# Patient Record
Sex: FEMALE | Race: WHITE | Employment: FULL TIME
[De-identification: names, ages, dates, MRNs, and addresses within clinical notes are randomized per-mention and may not be internally consistent; named-entity substitution may affect disease eponyms.]

---

## 2017-10-15 ENCOUNTER — HEALTH MAINTENANCE LETTER (OUTPATIENT)
Age: 27
End: 2017-10-15

## 2018-03-14 NOTE — PATIENT INSTRUCTIONS
Pap due sept 2019        Preventive Health Recommendations  Female Ages 26 - 39  Yearly exam:   See your health care provider every year in order to    Review health changes.     Discuss preventive care.      Review your medicines if you your doctor has prescribed any.    Until age 30: Get a Pap test every three years (more often if you have had an abnormal result).    After age 30: Talk to your doctor about whether you should have a Pap test every 3 years or have a Pap test with HPV screening every 5 years.   You do not need a Pap test if your uterus was removed (hysterectomy) and you have not had cancer.  You should be tested each year for STDs (sexually transmitted diseases), if you're at risk.   Talk to your provider about how often to have your cholesterol checked.  If you are at risk for diabetes, you should have a diabetes test (fasting glucose).  Shots: Get a flu shot each year. Get a tetanus shot every 10 years.   Nutrition:     Eat at least 5 servings of fruits and vegetables each day.    Eat whole-grain bread, whole-wheat pasta and brown rice instead of white grains and rice.    Talk to your provider about Calcium and Vitamin D.     Lifestyle    Exercise at least 150 minutes a week (30 minutes a day, 5 days of the week). This will help you control your weight and prevent disease.    Limit alcohol to one drink per day.    No smoking.     Wear sunscreen to prevent skin cancer.    See your dentist every six months for an exam and cleaning.  Prev

## 2018-03-16 ENCOUNTER — OFFICE VISIT (OUTPATIENT)
Dept: FAMILY MEDICINE | Facility: CLINIC | Age: 28
End: 2018-03-16
Payer: COMMERCIAL

## 2018-03-16 VITALS
DIASTOLIC BLOOD PRESSURE: 71 MMHG | HEIGHT: 64 IN | WEIGHT: 157 LBS | BODY MASS INDEX: 26.8 KG/M2 | TEMPERATURE: 98.3 F | OXYGEN SATURATION: 100 % | HEART RATE: 68 BPM | SYSTOLIC BLOOD PRESSURE: 106 MMHG

## 2018-03-16 DIAGNOSIS — Z00.01 ENCOUNTER FOR ROUTINE ADULT HEALTH EXAMINATION WITH ABNORMAL FINDINGS: Primary | ICD-10-CM

## 2018-03-16 DIAGNOSIS — R10.9 FLANK PAIN: ICD-10-CM

## 2018-03-16 LAB
ALBUMIN UR-MCNC: NEGATIVE MG/DL
APPEARANCE UR: CLEAR
BILIRUB UR QL STRIP: NEGATIVE
COLOR UR AUTO: YELLOW
GLUCOSE UR STRIP-MCNC: NEGATIVE MG/DL
HGB UR QL STRIP: NEGATIVE
KETONES UR STRIP-MCNC: NEGATIVE MG/DL
LEUKOCYTE ESTERASE UR QL STRIP: NEGATIVE
NITRATE UR QL: NEGATIVE
PH UR STRIP: 5.5 PH (ref 5–7)
SOURCE: NORMAL
SP GR UR STRIP: >1.03 (ref 1–1.03)
UROBILINOGEN UR STRIP-ACNC: 0.2 EU/DL (ref 0.2–1)

## 2018-03-16 PROCEDURE — 99385 PREV VISIT NEW AGE 18-39: CPT | Performed by: PHYSICIAN ASSISTANT

## 2018-03-16 PROCEDURE — 81003 URINALYSIS AUTO W/O SCOPE: CPT | Performed by: PHYSICIAN ASSISTANT

## 2018-03-16 NOTE — MR AVS SNAPSHOT
After Visit Summary   3/16/2018    Za Urrutia    MRN: [id number]           Patient Information     Date Of Birth          1990        Visit Information        Provider Department      3/16/2018 11:00 AM Elvira Wynne PA-C [facility]        Today's Diagnoses     Encounter for routine adult health examination with abnormal findings    -  1    Flank pain          Care Instructions     Pap due sept 2019        Preventive Health Recommendations  Female Ages 26 - 39  Yearly exam:   See your health care provider every year in order to    Review health changes.     Discuss preventive care.      Review your medicines if you your doctor has prescribed any.    Until age 30: Get a Pap test every three years (more often if you have had an abnormal result).    After age 30: Talk to your doctor about whether you should have a Pap test every 3 years or have a Pap test with HPV screening every 5 years.   You do not need a Pap test if your uterus was removed (hysterectomy) and you have not had cancer.  You should be tested each year for STDs (sexually transmitted diseases), if you're at risk.   Talk to your provider about how often to have your cholesterol checked.  If you are at risk for diabetes, you should have a diabetes test (fasting glucose).  Shots: Get a flu shot each year. Get a tetanus shot every 10 years.   Nutrition:     Eat at least 5 servings of fruits and vegetables each day.    Eat whole-grain bread, whole-wheat pasta and brown rice instead of white grains and rice.    Talk to your provider about Calcium and Vitamin D.     Lifestyle    Exercise at least 150 minutes a week (30 minutes a day, 5 days of the week). This will help you control your weight and prevent disease.    Limit alcohol to one drink per day.    No smoking.     Wear sunscreen to prevent skin cancer.    See your dentist every six months for an exam and cleaning.  Prev            Follow-ups after your  "visit        Who to contact     If you have questions or need follow up information about today's clinic visit or your schedule please contact [facility] directly at [phone].  Normal or non-critical lab and imaging results will be communicated to you by MyChart, letter or phone within 4 business days after the clinic has received the results. If you do not hear from us within 7 days, please contact the clinic through Breakout Studioshart or phone. If you have a critical or abnormal lab result, we will notify you by phone as soon as possible.  Submit refill requests through Simpler or call your pharmacy and they will forward the refill request to us. Please allow 3 business days for your refill to be completed.          Additional Information About Your Visit        Simpler Information     Simpler gives you secure access to your electronic health record. If you see a primary care provider, you can also send messages to your care team and make appointments. If you have questions, please call your primary care clinic.  If you do not have a primary care provider, please call [phone] and they will assist you.        Care EveryWhere ID     This is your Care EveryWhere ID. This could be used by other organizations to access your [facility] medical records  [id number]        Your Vitals Were     Pulse Temperature Height Pulse Oximetry BMI (Body Mass Index)       68 98.3  F (36.8  C) (Oral) 5' 3.58\" (1.615 m) 100% 27.3 kg/m2        Blood Pressure from Last 3 Encounters:   03/16/18 106/71    Weight from Last 3 Encounters:   03/16/18 157 lb (71.2 kg)              We Performed the Following     UA reflex to Microscopic        Primary Care Provider Office Phone # Fax #    [facility] [phone] [address] [address] [facility] [address]        Equal Access to Services     ZHEN JARVIS AH: Jacob Emanuel, dixie oakes, sumeet rodriguez, asha ch " alejandra quintana ah. So [facility] [phone].    ATENCIÓN: Si habla español, tiene a dickson disposición servicios gratuitos de asistencia lingüística. Neris al [phone].    We comply with applicable federal civil rights laws and [place] laws. We do not discriminate on the basis of race, color, national origin, age, disability, sex, sexual orientation, or gender identity.            Thank you!     Thank you for choosing [facility]  for your care. Our goal is always to provide you with excellent care. Hearing back from our patients is one way we can continue to improve our services. Please take a few minutes to complete the written survey that you may receive in the mail after your visit with us. Thank you!             Your Updated Medication List - Protect others around you: Learn how to safely use, store and throw away your medicines at www.disposemymeds.org.          This list is accurate as of 3/16/18 11:42 AM.  Always use your most recent med list.                   Brand Name Dispense Instructions for use Diagnosis    PROBIOTIC ADVANCED PO

## 2018-03-16 NOTE — Clinical Note
Please abstract the following data from this visit with this patient into the appropriate field in Epic:  Pap smear done on this date:  See care everywhere (approximately), by this group: kim, results were normal/negative.   9-2018
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
## 2018-03-16 NOTE — PROGRESS NOTES
Litzy Mendenhall,       Your recent test results are attached, if you have any questions or concerns please feel free to contact me via e-mail or call [phone].  Urine was negative. No kidney infection. If your flank pain continues, please recheck.        It was a pleasure to see you at your recent office visit.      Sincerely,  Elvira Wynne PA-C

## 2018-03-16 NOTE — Clinical Note
Please abstract the following data from this visit with this patient into the appropriate field in Epic:  Pap smear done on this date: 09/16/2016, by this group: [facility], results are in Care Everywhere.

## 2018-03-22 ENCOUNTER — MYC MEDICAL ADVICE (OUTPATIENT)
Dept: FAMILY MEDICINE | Facility: CLINIC | Age: 28
End: 2018-03-22

## 2018-04-04 ENCOUNTER — MYC MEDICAL ADVICE (OUTPATIENT)
Dept: FAMILY MEDICINE | Facility: CLINIC | Age: 28
End: 2018-04-04

## 2018-04-04 DIAGNOSIS — R10.9 FLANK PAIN: Primary | ICD-10-CM

## 2018-04-05 NOTE — TELEPHONE ENCOUNTER
Lab result note states to follow up if sx persist.  Who do you want her to follow up with?  Meredith Peck RN

## 2018-04-09 NOTE — PROGRESS NOTES
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  Just had a normal physical with me in march.  No h/o diabetes or heart or lung issues.     1 previous pregnancy and 1 child.  NVD. No complications with her pregnancy.      No mental health issues or substance abuse issues.     Interested in b12 shots per patient because she read about them. No history of low b12.  Will get labwork but if not low I will not prescribe.  She is aware she can buy OTC supplements of them if she so chooses.        She was fasting for labwork so I will try to add on labs if they can be done, otherwise she can return for them.       Problem list and histories reviewed & adjusted, as indicated.  Additional history: as documented    There is no problem list on file for this patient.    Past Surgical History:   Procedure Laterality Date     ENT SURGERY  07/2017    Nasal repair, Sinus       Social History   Substance Use Topics     Smoking status: Former Smoker     Years: 5.00     Types: Cigarettes     Quit date: 2016     Smokeless tobacco: Never Used     Alcohol use Yes      Comment: 3 drinks per week     Family History   Problem Relation Age of Onset     CANCER Mother      Kidney cancer stage 1, local skin cancer (non-melanoma)     Arthritis Mother      Other Cancer Mother      kidney cancer stage 1, 2008     MENTAL ILLNESS Father      depression/anxiety     Seizure Disorder Father      medication induced     No Known Problems Brother      DIABETES Paternal Grandmother      MENTAL ILLNESS Paternal Grandfather      No Known Problems Brother          Current Outpatient Prescriptions   Medication Sig Dispense Refill     Probiotic Product (PROBIOTIC ADVANCED PO)        Allergies    Allergen Reactions     Codeine Swelling     Tongue swelling       ROS:  Constitutional, HEENT, cardiovascular, pulmonary, gi and gu systems are negative, except as otherwise noted.    OBJECTIVE:     /66  Pulse 74  Temp 98.4  F (36.9  C) (Oral)  Resp 16  Wt 156 lb (70.8 kg)  SpO2 98%  Breastfeeding? No  BMI 27.13 kg/m2  Body mass index is 27.13 kg/(m^2).  GENERAL: healthy, alert and no distress  RESP: {:non-labored  MS: no gross musculoskeletal defects noted, no edema  SKIN: no suspicious lesions or rashes  NEURO: Normal strength and tone, mentation intact and speech normal  PSYCH: mentation appears normal, affect normal/bright        ASSESSMENT/PLAN:   ASSESSMENT / PLAN:  (Z02.89) Encounter for other administrative examinations  (primary encounter diagnosis)  Comment:   Plan:     (Z13.220) Lipid screening  Comment:   Plan: Lipid panel reflex to direct LDL Fasting            (Z13.1) Screening for diabetes mellitus  Comment:   Plan: Glucose            Letter given, ok for surrogate  Elvira Wynne PA-C  [facility]

## 2018-04-19 ENCOUNTER — OFFICE VISIT (OUTPATIENT)
Dept: FAMILY MEDICINE | Facility: CLINIC | Age: 28
End: 2018-04-19
Payer: COMMERCIAL

## 2018-04-19 VITALS
TEMPERATURE: 98.4 F | WEIGHT: 156 LBS | SYSTOLIC BLOOD PRESSURE: 108 MMHG | BODY MASS INDEX: 27.13 KG/M2 | HEART RATE: 74 BPM | DIASTOLIC BLOOD PRESSURE: 66 MMHG | OXYGEN SATURATION: 98 % | RESPIRATION RATE: 16 BRPM

## 2018-04-19 DIAGNOSIS — Z13.220 LIPID SCREENING: ICD-10-CM

## 2018-04-19 DIAGNOSIS — R53.83 FATIGUE, UNSPECIFIED TYPE: ICD-10-CM

## 2018-04-19 DIAGNOSIS — Z13.1 SCREENING FOR DIABETES MELLITUS: ICD-10-CM

## 2018-04-19 DIAGNOSIS — Z02.89 ENCOUNTER FOR OTHER ADMINISTRATIVE EXAMINATIONS: Primary | ICD-10-CM

## 2018-04-19 LAB
CHOLEST SERPL-MCNC: 142 MG/DL
GLUCOSE SERPL-MCNC: 81 MG/DL (ref 70–99)
HDLC SERPL-MCNC: 61 MG/DL
LDLC SERPL CALC-MCNC: 72 MG/DL
NONHDLC SERPL-MCNC: 81 MG/DL
TRIGL SERPL-MCNC: 43 MG/DL
VIT B12 SERPL-MCNC: 509 PG/ML (ref 193–986)

## 2018-04-19 PROCEDURE — 80061 LIPID PANEL: CPT | Performed by: PHYSICIAN ASSISTANT

## 2018-04-19 PROCEDURE — 82607 VITAMIN B-12: CPT | Performed by: PHYSICIAN ASSISTANT

## 2018-04-19 PROCEDURE — 99213 OFFICE O/P EST LOW 20 MIN: CPT | Performed by: PHYSICIAN ASSISTANT

## 2018-04-19 PROCEDURE — 82947 ASSAY GLUCOSE BLOOD QUANT: CPT | Performed by: PHYSICIAN ASSISTANT

## 2018-04-19 PROCEDURE — 36415 COLL VENOUS BLD VENIPUNCTURE: CPT | Performed by: PHYSICIAN ASSISTANT

## 2018-04-19 NOTE — MR AVS SNAPSHOT
After Visit Summary   4/19/2018    Za Urrutia    MRN: [id number]           Patient Information     Date Of Birth          1990        Visit Information        Provider Department      4/19/2018 11:00 AM Elvira Wynne PA-C [facility]        Today's Diagnoses     Lipid screening    -  1    Screening for diabetes mellitus           Follow-ups after your visit        Who to contact     If you have questions or need follow up information about today's clinic visit or your schedule please contact [facility] directly at [phone].  Normal or non-critical lab and imaging results will be communicated to you by InferXhart, letter or phone within 4 business days after the clinic has received the results. If you do not hear from us within 7 days, please contact the clinic through Inbentat or phone. If you have a critical or abnormal lab result, we will notify you by phone as soon as possible.  Submit refill requests through RoosterBi or call your pharmacy and they will forward the refill request to us. Please allow 3 business days for your refill to be completed.          Additional Information About Your Visit        MyChart Information     RoosterBi gives you secure access to your electronic health record. If you see a primary care provider, you can also send messages to your care team and make appointments. If you have questions, please call your primary care clinic.  If you do not have a primary care provider, please call [phone] and they will assist you.        Care EveryWhere ID     This is your Care EveryWhere ID. This could be used by other organizations to access your [facility] medical records  [id number]        Your Vitals Were     Pulse Temperature Respirations Pulse Oximetry Breastfeeding? BMI (Body Mass Index)    74 98.4  F (36.9  C) (Oral) 16 98% No 27.13 kg/m2       Blood Pressure from Last 3 Encounters:   04/19/18 108/66   03/16/18 106/71     Weight from Last 3 Encounters:   04/19/18 156 lb (70.8 kg)   03/16/18 157 lb (71.2 kg)              We Performed the Following     Glucose     Lipid panel reflex to direct LDL Fasting        Primary Care Provider Office Phone # Fax #    [facility] [phone] [address] [address] [facility] [address]        Equal Access to Services     MEGAN JARVIS : Hadii aad ku hadasho Soomaali, waaxda luqadaha, qaybta kaalmada adeegyada, waxay idiin hayaan adeeg kharash la'aan ah. So [facility] [phone].    ATENCIÓN: Si habla español, tiene a dickson disposición servicios gratuitos de asistencia lingüística. Llame al [phone].    We comply with applicable federal civil rights laws and [place] laws. We do not discriminate on the basis of race, color, national origin, age, disability, sex, sexual orientation, or gender identity.            Thank you!     Thank you for choosing [facility]  for your care. Our goal is always to provide you with excellent care. Hearing back from our patients is one way we can continue to improve our services. Please take a few minutes to complete the written survey that you may receive in the mail after your visit with us. Thank you!             Your Updated Medication List - Protect others around you: Learn how to safely use, store and throw away your medicines at www.disposemymeds.org.          This list is accurate as of 4/19/18 11:28 AM.  Always use your most recent med list.                   Brand Name Dispense Instructions for use Diagnosis    PROBIOTIC ADVANCED PO

## 2018-04-19 NOTE — LETTER
[facility]  [address] [facility] [address]  Phone: [phone]    April 19, 2018        Za Urrutia  [address] [facility] [address]          To whom it may concern:    RE: Za Urrutia    Patient was seen today at our clinic.  I see no medical reason for her not to be a surrogate;  she is a good candidate for this.     Please contact me for questions or concerns.      Sincerely,        Elvira Wynne PA-C

## 2018-04-19 NOTE — NURSING NOTE
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 No  BMI 27.13 kg/m2 Estimated body mass index is 27.13 kg/(m^2) as calculated from the following:    Height as of 3/16/18: 5' 3.58\" (1.615 m).    Weight as of this encounter: 156 lb (70.8 kg).  Medication Reconciliation: complete      Elia Rodrigez MA    "

## 2018-04-20 NOTE — PROGRESS NOTES
Litzy Mendenhall,       Your recent test results are attached, if you have any questions or concerns please feel free to contact me via e-mail or call [phone].  Normal b12, I would not recommend injections for you.        It was a pleasure to see you at your recent office visit.      Sincerely,  Elvira Wynne PA-C

## 2018-04-22 NOTE — PROGRESS NOTES
Litzy Mendenhall,       Your recent test results are attached, if you have any questions or concerns please feel free to contact me via e-mail or call [phone].  Cholesterol normal.  Glucose normal no diabetes.        It was a pleasure to see you at your recent office visit.      Sincerely,  Elvira Wynne PA-C

## 2018-05-10 ENCOUNTER — MYC MEDICAL ADVICE (OUTPATIENT)
Dept: OBGYN | Facility: CLINIC | Age: 28
End: 2018-05-10

## 2018-05-16 ENCOUNTER — OFFICE VISIT (OUTPATIENT)
Dept: OBGYN | Facility: CLINIC | Age: 28
End: 2018-05-16
Payer: COMMERCIAL

## 2018-05-16 VITALS
SYSTOLIC BLOOD PRESSURE: 115 MMHG | BODY MASS INDEX: 27.41 KG/M2 | DIASTOLIC BLOOD PRESSURE: 77 MMHG | HEART RATE: 83 BPM | WEIGHT: 157.6 LBS | OXYGEN SATURATION: 97 %

## 2018-05-16 DIAGNOSIS — N89.8 VAGINAL DRYNESS: Primary | ICD-10-CM

## 2018-05-16 PROCEDURE — 99214 OFFICE O/P EST MOD 30 MIN: CPT | Performed by: OBSTETRICS & GYNECOLOGY

## 2018-05-16 NOTE — PROGRESS NOTES
"Za is a 28 year old  female . She presents today regarding a couple of issues.   She has noticed that she has had some lubrication issues.  She has had some pain with intercourse when she is not \"self lubricating.\"   She notes that she may have fore play and she will be touched and messaged to relax and she will get lubricated, but then the lubrication stops.  She has used some lubrication, water based, she thinks it is Trojan.  She had used K-Y liquibeads and it helped, but afterward, it became \"a sticky mess\" and her boyfriend was not happy about it.  No toys used.  She does this in bed.  No tub or shower fore play.  It has been going on for a couple of months.  She had this also when she had her IUD, for about 4-6 months before the IUD was removed.  She had a decrease in the libido with the IUD and since the IUD was removed, she has had return of the libido, but the lubrication issue remains.  She had an IUD placed after her daughter was born and she had it switched twice as she thought her hormones might be out of balance, but it did not help.  She had the IUD removed in January.   She and her boyfriend have been together for over 3 years.  The lube helps, but she does not know of any aggravating factors.  She does not use any douching products.  She has had some weight gain, of about 15 pounds in past year.        Component      Latest Ref Rng & Units 2018   Cholesterol      <200 mg/dL 142   Triglycerides      <150 mg/dL 43   HDL Cholesterol      >49 mg/dL 61   LDL Cholesterol Calculated      <100 mg/dL 72   Non HDL Cholesterol      <130 mg/dL 81   Glucose      70 - 99 mg/dL 81       TSH (2018 4:52 PM)  TSH (2018 4:52 PM)   Component Value Ref Range   TSH 1.92          Past Medical History:   Diagnosis Date     NO ACTIVE PROBLEMS        Past Surgical History:   Procedure Laterality Date     ENT SURGERY  2017    Nasal repair, Sinus       Obstetric History       T0      L1 "     SAB0   TAB0   Ectopic0   Multiple0   Live Births1       # Outcome Date GA Lbr Rico/2nd Weight Sex Delivery Anes PTL Lv   1 Para 16   7 lb 11 oz (3.487 kg) F  EPI  FRANKLIN      Apgar1:  7                Apgar5: 8          Gynecological History         Patient's last menstrual period was 2018.  Menarche: 15/menses: monthly/duration: 2-3 days with up to 5 days      no STD/no PID/she has used the Mirena IUD   no abnormal pap smear (last pap 2016)      see above HPI        Allergies   Allergen Reactions     Codeine Swelling     Tongue swelling       Current Outpatient Prescriptions   Medication Sig Dispense Refill     Probiotic Product (PROBIOTIC ADVANCED PO)          Social History     Social History     Marital status: Single     Spouse name: N/A     Number of children: N/A     Years of education: 1     Occupational History      [employer]     Social History Main Topics     Smoking status: Current Some Day Smoker     Years: 5.00     Types: Cigarettes     Last attempt to quit:      Smokeless tobacco: Never Used      Comment: a couple of cig a day      Alcohol use Yes      Comment: 3 drinks per week     Drug use: No     Sexual activity: Yes     Partners: Male     Birth control/ protection: Pull-out method     Other Topics Concern     Parent/Sibling W/ Cabg, Mi Or Angioplasty Before 65f 55m? No     Social History Narrative       Family History   Problem Relation Age of Onset     CANCER Mother      Kidney cancer stage 1, local skin cancer (non-melanoma)     Arthritis Mother      Other Cancer Mother      kidney cancer stage 1,      MENTAL ILLNESS Father      depression/anxiety     Seizure Disorder Father      medication induced     No Known Problems Brother      DIABETES Paternal Grandmother      MENTAL ILLNESS Paternal Grandfather      No Known Problems Brother        Review of Systems:  10 point ROS of systems including Constitutional, Eyes, Respiratory, Cardiovascular,  "Gastroenterology, Genitourinary, Integumentary, Muscularskeletal, Psychiatric were all negative except for pertinent positives noted in my HPI and in the PMH.        EXAM:  /77 (BP Location: Right arm, Cuff Size: Adult Regular)  Pulse 83  Wt 157 lb 9.6 oz (71.5 kg)  LMP 04/16/2018  SpO2 97%  Breastfeeding? No  BMI 27.41 kg/m2  Body mass index is 27.41 kg/(m^2).  General:  WNWD female, NAD  Alert  Oriented x 3  Gait:  Normal  Skin:  Normal skin turgor  HEENT:  NC/AT, EOMI  Neck:  No masses noted, symmetrical  Lungs:  Good respiratory effort   Abdomen:  Non-tender, non-distended.  Vulva: No external lesions, normal hair distribution, no adenopathy  BUS:  Normal, no masses noted  Urethra:  No hypermobility noted  Urethral meatus:  No masses or lesions seen.  Vagina: Moist, pink, no abnormal discharge, well rugated, no lesions  Cervix: Smooth, pink, no visible lesions  Uterus: Normal size, anteverted, non-tender, mobile  Ovaries: No mass, non-tender, mobile  Perianal: no masses or lesions seen.    Extremities:  No clubbing, cyanosis or edema.         ASSESSMENT:  Vaginal dryness      PLAN:  Her symptomatology seems difficult to evaluate.    We discussed about hormone levels.  She is menstruating regularly, so it is unlikely \"hormone testing\" would be beneficial.    I suggest to try Replens for vaginal moisturizing, which may be used daily.  But with intercourse, I suggest to try WET Platinum or Astroglide, both would have better effects regarding intercourse than the K-Y.    I suggest to try these first and if the symptoms continue, then return for additional evaluation.    She voiced her understanding and questions seemed to be answered.     TT 35-40 min  CT greater than 60%    Maico Nagy MD    "

## 2018-05-16 NOTE — MR AVS SNAPSHOT
After Visit Summary   5/16/2018    Za Urrutia    MRN: [id number]           Patient Information     Date Of Birth          1990        Visit Information        Provider Department      5/16/2018 1:00 PM Maico Nagy MD [facility]        Today's Diagnoses     Vaginal dryness    -  1       Follow-ups after your visit        Follow-up notes from your care team     Return if symptoms worsen or fail to improve.      Who to contact     If you have questions or need follow up information about today's clinic visit or your schedule please contact [facility] directly at [phone].  Normal or non-critical lab and imaging results will be communicated to you by [employer], letter or phone within 4 business days after the clinic has received the results. If you do not hear from us within 7 days, please contact the clinic through [employer] or phone. If you have a critical or abnormal lab result, we will notify you by phone as soon as possible.  Submit refill requests through [employer] or call your pharmacy and they will forward the refill request to us. Please allow 3 business days for your refill to be completed.          Additional Information About Your Visit        MyChart Information     [employer] gives you secure access to your electronic health record. If you see a primary care provider, you can also send messages to your care team and make appointments. If you have questions, please call your primary care clinic.  If you do not have a primary care provider, please call [phone] and they will assist you.        Care EveryWhere ID     This is your Care EveryWhere ID. This could be used by other organizations to access your [place] medical records  [id number]        Your Vitals Were     Pulse Last Period Pulse Oximetry Breastfeeding? BMI (Body Mass Index)       83 04/16/2018 97% No 27.41 kg/m2        Blood Pressure from Last 3 Encounters:   05/16/18 115/77    04/19/18 108/66   03/16/18 106/71    Weight from Last 3 Encounters:   05/16/18 157 lb 9.6 oz (71.5 kg)   04/19/18 156 lb (70.8 kg)   03/16/18 157 lb (71.2 kg)              Today, you had the following     No orders found for display       Primary Care Provider Office Phone # Fax #    [facility] [phone] [address] [address] [address]        Equal Access to Services     ZHEN JARVIS : Hadii aad ku hadasho Soomaali, waaxda luqadaha, qaybta kaalmada adeegyada, waxagueda clayin hayjeraldn jing quintana . So [facility] [phone].    ATENCIÓN: Si habla español, tiene a dickson disposición servicios gratuitos de asistencia lingüística. Llame al [phone].    We comply with applicable federal civil rights laws and [place] laws. We do not discriminate on the basis of race, color, national origin, age, disability, sex, sexual orientation, or gender identity.            Thank you!     Thank you for choosing [facility]  for your care. Our goal is always to provide you with excellent care. Hearing back from our patients is one way we can continue to improve our services. Please take a few minutes to complete the written survey that you may receive in the mail after your visit with us. Thank you!             Your Updated Medication List - Protect others around you: Learn how to safely use, store and throw away your medicines at www.disposemymeds.org.          This list is accurate as of 5/16/18 11:59 PM.  Always use your most recent med list.                   Brand Name Dispense Instructions for use Diagnosis    PROBIOTIC ADVANCED PO

## 2018-05-25 ENCOUNTER — E-VISIT (OUTPATIENT)
Dept: FAMILY MEDICINE | Facility: CLINIC | Age: 28
End: 2018-05-25
Payer: COMMERCIAL

## 2018-05-25 DIAGNOSIS — R09.81 CONGESTION OF PARANASAL SINUS: Primary | ICD-10-CM

## 2018-05-25 PROCEDURE — 99444 ZZC PHYSICIAN ONLINE EVALUATION & MANAGEMENT SERVICE: CPT | Performed by: PHYSICIAN ASSISTANT

## 2018-05-29 RX ORDER — FLUTICASONE PROPIONATE 50 MCG
1-2 SPRAY, SUSPENSION (ML) NASAL DAILY
Qty: 1 BOTTLE | Refills: 5 | Status: SHIPPED | OUTPATIENT
Start: 2018-05-29 | End: 2019-02-05

## 2018-05-29 RX ORDER — AMOXICILLIN 875 MG
875 TABLET ORAL 2 TIMES DAILY
Qty: 20 TABLET | Refills: 0 | Status: SHIPPED | OUTPATIENT
Start: 2018-05-29 | End: 2018-10-18

## 2018-07-03 ENCOUNTER — VIRTUAL VISIT (OUTPATIENT)
Dept: FAMILY MEDICINE | Facility: OTHER | Age: 28
End: 2018-07-03

## 2018-07-04 NOTE — PROGRESS NOTES
"Date:   Clinician: Liz Joe  Clinician NPI: [id number]  Patient: Za Urrutia  Patient : 1990  Patient Address: [address] [place] [id number]  Patient Phone: [phone]  Visit Protocol: UTI  Patient Summary:  Za is a 28 year old ( : 1990 ) female who initiated a Visit for a presumed bladder infection. When asked the question \"Please sign me up to receive news, health information and promotions from [facility].\", Za responded \"No\".    Her symptoms began 2 days ago and consist of dysuria, urgency, and loss of appetite. Za feels feverish but was unable to measure her temperature.   Symptom Details   She denies urinary frequency, foul smelling urine, nausea, hesitation, hematuria, urinary incontinence, vaginal discharge, abdominal pain, vomiting, flank pain, and chills. Za has never had kidney stones. She has not been hospitalized, been a patient in a nursing home, or had a catheter in the past two weeks. She denies risk factors for sexually transmitted infections.   Za has not had a bladder infection in the past.   Za does not get yeast infections when she takes antibiotics.   She denies pregnancy and denies breastfeeding. She does not menstruate.   MEDICATIONS: No current medications, ALLERGIES: NKDA  Clinician Response:  Dear Za,  I am sorry you are not feeling well. To determine the most appropriate care for you, I would like you to be seen in person to further discuss your health history and symptoms.  You will not be charged for this Visit. Thank you for trusting us with your care.   Diagnosis: Refer for additional evaluation  Diagnosis ICD: R69  Diagnosis ICD: 462.0  "

## 2018-08-24 ENCOUNTER — MYC MEDICAL ADVICE (OUTPATIENT)
Dept: FAMILY MEDICINE | Facility: CLINIC | Age: 28
End: 2018-08-24

## 2018-10-07 ENCOUNTER — MYC MEDICAL ADVICE (OUTPATIENT)
Dept: FAMILY MEDICINE | Facility: CLINIC | Age: 28
End: 2018-10-07

## 2018-10-16 NOTE — PROGRESS NOTES
SUBJECTIVE:                                                    Za Urrutia is a 28 year old female who presents to clinic today for the following health issues:    FMLA forms to be filled out for work per patient. This is the first I am hearing of this request today as when she called in it said for personal reasons.    Patient works in [employer] for [employer] per patient.  Work has made her anxiety worse.     She Called in on Friday Oct. 12th,  Had bad anxiety that day.  Was told she could take personal days this week but also to look into FMLA which is why she is here.  She is hoping to be off to work more with her therapist for the next 2-3 weeks.     Does go to therapy at [facility].  Has been going there since she was pregnant (about two years).  Used to go monthly but now is going more frequently.  It is helpful.  Work makes her anxiety worse.     Was on anxiety medications previously but did not like the way they made her feel.  Was on zoloft previously.      WALLACE is 3 today.  Symptoms are worse if she is at work.  Has stress with her supervisor. Is actively applying for a different position.     Denies suicidal or homicidal thoughts.  Patient instructed to go to the emergency room or call 911 if these occur.    No depression symptoms per patient.     Problem list and histories reviewed & adjusted, as indicated.  Additional history: as documented    Patient Active Problem List   Diagnosis     Anxiety     Past Surgical History:   Procedure Laterality Date     ENT SURGERY  07/2017    Nasal repair, Sinus       Social History   Substance Use Topics     Smoking status: Current Some Day Smoker     Years: 5.00     Types: Cigarettes     Last attempt to quit: 2016     Smokeless tobacco: Never Used      Comment: a couple of cig a day      Alcohol use Yes      Comment: 3 drinks per week     Family History   Problem Relation Age of Onset     Cancer Mother      Kidney cancer stage 1, local  skin cancer (non-melanoma)     Arthritis Mother      Other Cancer Mother      kidney cancer stage 1, 2008     Mental Illness Father      depression/anxiety     Seizure Disorder Father      medication induced     No Known Problems Brother      Diabetes Paternal Grandmother      Mental Illness Paternal Grandfather      No Known Problems Brother          Current Outpatient Prescriptions   Medication Sig Dispense Refill     desogestrel-ethinyl estradiol (ISIBLOOM) 0.15-30 MG-MCG per tablet Take 1 tablet by mouth daily       fluticasone (FLONASE) 50 MCG/ACT spray Spray 1-2 sprays into both nostrils daily 1 Bottle 5     Allergies   Allergen Reactions     Codeine Swelling     Tongue swelling       ROS:  Constitutional, HEENT, cardiovascular, pulmonary, GI, , musculoskeletal, neuro, skin, endocrine and psych systems are negative, except as otherwise noted.    OBJECTIVE:     /78  Pulse 76  Temp 99.3  F (37.4  C) (Oral)  Resp 16  Wt 161 lb (73 kg)  SpO2 99%  Breastfeeding? No  BMI 28 kg/m2  Body mass index is 28 kg/(m^2).  GENERAL: healthy, alert and no distress  RESP: lungs clear to auscultation - no rales, rhonchi or wheezes  CV: regular rate and rhythm, normal S1 S2, no S3 or S4, no murmur, click or rub, no peripheral edema and peripheral pulses strong  MS: no gross musculoskeletal defects noted, no edema  PSYCH: mentation appears normal, affect normal/bright        ASSESSMENT/PLAN:   ASSESSMENT / PLAN:  (F41.9) Anxiety  (primary encounter diagnosis)  Comment:   Plan: discussed if worsens or continues longer than FMLA given I would be most comfortable either 1) starting her on a medication also or 2) having her see psych.   But she can use the time to work with her therapists and on her anxiety by herself and see how she does. She will recheck with me before fmla runs out if still having symptoms.     Billin min spent face-to-face with patient. 20 min on history, 1 on exam, 4 on discussing diagnosis and  treatment plan.       Elvira Wynne PA-C  [facility]

## 2018-10-18 ENCOUNTER — OFFICE VISIT (OUTPATIENT)
Dept: FAMILY MEDICINE | Facility: CLINIC | Age: 28
End: 2018-10-18
Payer: COMMERCIAL

## 2018-10-18 VITALS
DIASTOLIC BLOOD PRESSURE: 78 MMHG | HEART RATE: 76 BPM | OXYGEN SATURATION: 99 % | TEMPERATURE: 99.3 F | SYSTOLIC BLOOD PRESSURE: 112 MMHG | RESPIRATION RATE: 16 BRPM | BODY MASS INDEX: 28 KG/M2 | WEIGHT: 161 LBS

## 2018-10-18 DIAGNOSIS — F41.9 ANXIETY: Primary | ICD-10-CM

## 2018-10-18 PROCEDURE — 99214 OFFICE O/P EST MOD 30 MIN: CPT | Performed by: PHYSICIAN ASSISTANT

## 2018-10-18 RX ORDER — DESOGESTREL AND ETHINYL ESTRADIOL 0.15-0.03
1 KIT ORAL DAILY
COMMUNITY

## 2018-10-18 ASSESSMENT — ANXIETY QUESTIONNAIRES
GAD7 TOTAL SCORE: 3
3. WORRYING TOO MUCH ABOUT DIFFERENT THINGS: SEVERAL DAYS
1. FEELING NERVOUS, ANXIOUS, OR ON EDGE: SEVERAL DAYS
2. NOT BEING ABLE TO STOP OR CONTROL WORRYING: SEVERAL DAYS
5. BEING SO RESTLESS THAT IT IS HARD TO SIT STILL: NOT AT ALL
IF YOU CHECKED OFF ANY PROBLEMS ON THIS QUESTIONNAIRE, HOW DIFFICULT HAVE THESE PROBLEMS MADE IT FOR YOU TO DO YOUR WORK, TAKE CARE OF THINGS AT HOME, OR GET ALONG WITH OTHER PEOPLE: SOMEWHAT DIFFICULT
6. BECOMING EASILY ANNOYED OR IRRITABLE: NOT AT ALL
7. FEELING AFRAID AS IF SOMETHING AWFUL MIGHT HAPPEN: NOT AT ALL

## 2018-10-18 ASSESSMENT — PATIENT HEALTH QUESTIONNAIRE - PHQ9: 5. POOR APPETITE OR OVEREATING: NOT AT ALL

## 2018-10-18 NOTE — MR AVS SNAPSHOT
After Visit Summary   10/18/2018    Za Urrutia    MRN: [id number]           Patient Information     Date Of Birth          1990        Visit Information        Provider Department      10/18/2018 12:40 PM Elvira Wynne PA-C [facility]        Today's Diagnoses     Anxiety    -  1       Follow-ups after your visit        Who to contact     If you have questions or need follow up information about today's clinic visit or your schedule please contact [facility] directly at [phone].  Normal or non-critical lab and imaging results will be communicated to you by [employer], letter or phone within 4 business days after the clinic has received the results. If you do not hear from us within 7 days, please contact the clinic through [employer] or phone. If you have a critical or abnormal lab result, we will notify you by phone as soon as possible.  Submit refill requests through [employer] or call your pharmacy and they will forward the refill request to us. Please allow 3 business days for your refill to be completed.          Additional Information About Your Visit        MyChart Information     [employer] gives you secure access to your electronic health record. If you see a primary care provider, you can also send messages to your care team and make appointments. If you have questions, please call your primary care clinic.  If you do not have a primary care provider, please call [phone] and they will assist you.        Care EveryWhere ID     This is your Care EveryWhere ID. This could be used by other organizations to access your [place] medical records  [id number]        Your Vitals Were     Pulse Temperature Respirations Pulse Oximetry Breastfeeding? BMI (Body Mass Index)    76 99.3  F (37.4  C) (Oral) 16 99% No 28 kg/m2       Blood Pressure from Last 3 Encounters:   10/18/18 112/78   05/16/18 115/77   04/19/18 108/66    Weight from Last 3 Encounters:    10/18/18 161 lb (73 kg)   05/16/18 157 lb 9.6 oz (71.5 kg)   04/19/18 156 lb (70.8 kg)              Today, you had the following     No orders found for display       Primary Care Provider Office Phone # Fax #    [facility] [phone] [address] [facility] [address]        Equal Access to Services     ZHEN JARVIS : Hadii aad ku hadasho Soomaali, waaxda luqadaha, qaybta kaalmada adeegyada, waxay idiin hayaan adeeg khkristen lamelany . So [facility] [phone].    ATENCIÓN: Si marta keller, tiene a dickson disposición servicios gratuitos de asistencia lingüística. Llame al [phone].    We comply with applicable federal civil rights laws and [place] laws. We do not discriminate on the basis of race, color, national origin, age, disability, sex, sexual orientation, or gender identity.            Thank you!     Thank you for choosing [facility]  for your care. Our goal is always to provide you with excellent care. Hearing back from our patients is one way we can continue to improve our services. Please take a few minutes to complete the written survey that you may receive in the mail after your visit with us. Thank you!             Your Updated Medication List - Protect others around you: Learn how to safely use, store and throw away your medicines at www.disposemymeds.org.          This list is accurate as of 10/18/18  1:23 PM.  Always use your most recent med list.                   Brand Name Dispense Instructions for use Diagnosis    fluticasone 50 MCG/ACT spray    FLONASE    1 Bottle    Spray 1-2 sprays into both nostrils daily    Congestion of paranasal sinus       ISIBLOOM 0.15-30 MG-MCG per tablet   Generic drug:  desogestrel-ethinyl estradiol      Take 1 tablet by mouth daily

## 2018-10-18 NOTE — NURSING NOTE
"Chief Complaint   Patient presents with     Forms     Need paper work filled out for [facility]     Health Maintenance     orders pended       Initial /78  Pulse 76  Temp 99.3  F (37.4  C) (Oral)  Resp 16  Wt 161 lb (73 kg)  SpO2 99%  Breastfeeding? No  BMI 28 kg/m2 Estimated body mass index is 28 kg/(m^2) as calculated from the following:    Height as of 3/16/18: 5' 3.58\" (1.615 m).    Weight as of this encounter: 161 lb (73 kg).  Medication Reconciliation: complete      Elia Rodrigez MA  "

## 2018-10-19 ASSESSMENT — PATIENT HEALTH QUESTIONNAIRE - PHQ9: SUM OF ALL RESPONSES TO PHQ QUESTIONS 1-9: 0

## 2018-10-19 ASSESSMENT — ANXIETY QUESTIONNAIRES: GAD7 TOTAL SCORE: 3

## 2018-10-29 ENCOUNTER — TELEPHONE (OUTPATIENT)
Dept: FAMILY MEDICINE | Facility: CLINIC | Age: 28
End: 2018-10-29

## 2018-10-29 NOTE — TELEPHONE ENCOUNTER
Disability orm received via fax from [employer]\"". Placed in your basket.Nicole Phillips MA/AYAD

## 2018-11-02 ENCOUNTER — TELEPHONE (OUTPATIENT)
Dept: FAMILY MEDICINE | Facility: CLINIC | Age: 28
End: 2018-11-02

## 2018-11-02 DIAGNOSIS — F41.9 ANXIETY: Primary | ICD-10-CM

## 2018-11-02 NOTE — TELEPHONE ENCOUNTER
Patient was given FMLA paperwork. Now I have received disability paperwork which we did not discuss.  I am not sure what patient is asking for, this may require an OV to go over but first can we just ask what it is she is asking for disability wise?      Is she back to work? Elvira

## 2018-11-02 NOTE — TELEPHONE ENCOUNTER
I have return to work date nov. 5th, please confirm this with patient.  disability is not the same as FMLA but I can try to fill it out similarly.  Any further needs she needs to make a f/u appt.     Elvira Wynne PA-C

## 2018-11-02 NOTE — TELEPHONE ENCOUNTER
Patient called back, yes date of 11/5/18 is correct. I informed her of the rest of your message.Nicole Phillips MA/AYAD

## 2018-11-02 NOTE — TELEPHONE ENCOUNTER
Called and spoke patient, she said that the forms are for the same as for FMLA.Nicole Phillips MA/AYAD

## 2019-01-24 ENCOUNTER — TELEPHONE (OUTPATIENT)
Dept: FAMILY MEDICINE | Facility: CLINIC | Age: 29
End: 2019-01-24

## 2019-01-24 NOTE — TELEPHONE ENCOUNTER
Please call patient, I have received a form from [employer]. It is from their psychiatric physician.  They state that they do not believe she needed to stop work on 10-14-18.  Has the patient applied for disability ?Is she working currently? I have no seen her for a while.  I am not sure why I am receiving this letter. It is asking me to say if I agree or disagree with their statement and ask a bunch of questions that I can't answer.     Elvira

## 2019-01-24 NOTE — TELEPHONE ENCOUNTER
Patient called back, she said that this can be disregarded, she has a new job.Nicole Phillips MA/AYAD

## 2019-01-29 NOTE — TELEPHONE ENCOUNTER
Ayaz Gibbons MD from [facility] is calling wondering about the letter that has been sent.  Please call to advise.  Thank [phone]

## 2019-01-29 NOTE — TELEPHONE ENCOUNTER
Called and informed Ayaz that the patient said that we do not need to do anything with this because she has a new job.Nicole Phillips MA/AYAD

## 2019-02-05 ENCOUNTER — MYC REFILL (OUTPATIENT)
Dept: FAMILY MEDICINE | Facility: CLINIC | Age: 29
End: 2019-02-05

## 2019-02-05 ENCOUNTER — E-VISIT (OUTPATIENT)
Dept: FAMILY MEDICINE | Facility: CLINIC | Age: 29
End: 2019-02-05
Payer: COMMERCIAL

## 2019-02-05 DIAGNOSIS — R09.81 CONGESTION OF PARANASAL SINUS: ICD-10-CM

## 2019-02-05 PROCEDURE — 99444 ZZC PHYSICIAN ONLINE EVALUATION & MANAGEMENT SERVICE: CPT | Performed by: PHYSICIAN ASSISTANT

## 2019-02-05 RX ORDER — FLUTICASONE PROPIONATE 50 MCG
1-2 SPRAY, SUSPENSION (ML) NASAL DAILY
Qty: 1 BOTTLE | Refills: 8 | Status: SHIPPED | OUTPATIENT
Start: 2019-02-05 | End: 2019-02-05

## 2019-02-05 RX ORDER — AMOXICILLIN 875 MG
875 TABLET ORAL 2 TIMES DAILY
Qty: 20 TABLET | Refills: 0 | Status: SHIPPED | OUTPATIENT
Start: 2019-02-05 | End: 2019-02-15

## 2019-02-05 RX ORDER — FLUTICASONE PROPIONATE 50 MCG
1-2 SPRAY, SUSPENSION (ML) NASAL DAILY
Qty: 1 BOTTLE | Refills: 8 | Status: SHIPPED | OUTPATIENT
Start: 2019-02-05

## 2019-02-05 NOTE — PATIENT INSTRUCTIONS

## 2019-03-26 ENCOUNTER — MYC MEDICAL ADVICE (OUTPATIENT)
Dept: FAMILY MEDICINE | Facility: CLINIC | Age: 29
End: 2019-03-26

## 2019-03-26 DIAGNOSIS — T78.40XA ALLERGIC STATE, INITIAL ENCOUNTER: Primary | ICD-10-CM

## 2019-12-01 DIAGNOSIS — R09.81 CONGESTION OF PARANASAL SINUS: ICD-10-CM

## 2019-12-03 RX ORDER — AMOXICILLIN 875 MG
TABLET ORAL
Qty: 20 TABLET | Refills: 0 | OUTPATIENT
Start: 2019-12-03

## 2019-12-03 NOTE — TELEPHONE ENCOUNTER
Medication inappropriate for auto refill.  Left message on answering machine for patient/parent to call back.   [phone].  Katina Felipe RN

## 2019-12-03 NOTE — TELEPHONE ENCOUNTER
Routing refill request to provider for review/approval because:  Drug not on the [facility] refill protocol   Last prescribed 2/2019 for associated dx: Congestion of paranasal sinus [R09.81]     Last office visit in primary care 4/9/19.    Ele Abdi, BSN, RN

## 2020-02-24 ENCOUNTER — HEALTH MAINTENANCE LETTER (OUTPATIENT)
Age: 30
End: 2020-02-24

## 2020-12-13 ENCOUNTER — HEALTH MAINTENANCE LETTER (OUTPATIENT)
Age: 30
End: 2020-12-13

## 2021-04-17 ENCOUNTER — HEALTH MAINTENANCE LETTER (OUTPATIENT)
Age: 31
End: 2021-04-17

## 2021-09-26 ENCOUNTER — HEALTH MAINTENANCE LETTER (OUTPATIENT)
Age: 31
End: 2021-09-26

## 2022-05-08 ENCOUNTER — HEALTH MAINTENANCE LETTER (OUTPATIENT)
Age: 32
End: 2022-05-08

## 2022-09-21 ENCOUNTER — LAB REQUISITION (OUTPATIENT)
Dept: LAB | Facility: CLINIC | Age: 32
End: 2022-09-21
Payer: COMMERCIAL

## 2022-09-21 DIAGNOSIS — Z01.83 ENCOUNTER FOR BLOOD TYPING: ICD-10-CM

## 2022-09-21 LAB
ANTIBODY SCREEN: NEGATIVE
SPECIMEN EXPIRATION DATE: NORMAL

## 2022-09-21 PROCEDURE — 86850 RBC ANTIBODY SCREEN: CPT | Mod: ORL | Performed by: NURSE PRACTITIONER

## 2022-11-10 ENCOUNTER — LAB REQUISITION (OUTPATIENT)
Dept: LAB | Facility: CLINIC | Age: 32
End: 2022-11-10
Payer: COMMERCIAL

## 2022-11-10 DIAGNOSIS — Z3A.35 35 WEEKS GESTATION OF PREGNANCY: ICD-10-CM

## 2022-11-10 PROCEDURE — 87653 STREP B DNA AMP PROBE: CPT | Mod: ORL | Performed by: NURSE PRACTITIONER

## 2022-11-11 LAB — GP B STREP DNA SPEC QL NAA+PROBE: NEGATIVE

## 2023-04-23 ENCOUNTER — HEALTH MAINTENANCE LETTER (OUTPATIENT)
Age: 33
End: 2023-04-23

## 2023-06-02 ENCOUNTER — HEALTH MAINTENANCE LETTER (OUTPATIENT)
Age: 33
End: 2023-06-02

## 2024-11-17 ENCOUNTER — HEALTH MAINTENANCE LETTER (OUTPATIENT)
Age: 34
End: 2024-11-17

## 2024-12-02 ENCOUNTER — LAB REQUISITION (OUTPATIENT)
Dept: LAB | Facility: CLINIC | Age: 34
End: 2024-12-02

## 2024-12-02 DIAGNOSIS — Z13.220 ENCOUNTER FOR SCREENING FOR LIPOID DISORDERS: ICD-10-CM

## 2024-12-02 DIAGNOSIS — Z13.1 ENCOUNTER FOR SCREENING FOR DIABETES MELLITUS: ICD-10-CM

## 2024-12-02 DIAGNOSIS — Z13.29 ENCOUNTER FOR SCREENING FOR OTHER SUSPECTED ENDOCRINE DISORDER: ICD-10-CM

## 2024-12-02 LAB
CHOLEST SERPL-MCNC: 146 MG/DL
FASTING STATUS PATIENT QL REPORTED: YES
FASTING STATUS PATIENT QL REPORTED: YES
GLUCOSE SERPL-MCNC: 92 MG/DL (ref 70–99)
HDLC SERPL-MCNC: 54 MG/DL
HOLD SPECIMEN: NORMAL
LDLC SERPL CALC-MCNC: 83 MG/DL
NONHDLC SERPL-MCNC: 92 MG/DL
T4 FREE SERPL-MCNC: 0.89 NG/DL (ref 0.9–1.7)
TRIGL SERPL-MCNC: 45 MG/DL
TSH SERPL DL<=0.005 MIU/L-ACNC: 2.32 UIU/ML (ref 0.3–4.2)

## 2024-12-02 PROCEDURE — 82947 ASSAY GLUCOSE BLOOD QUANT: CPT | Performed by: NURSE PRACTITIONER

## 2024-12-02 PROCEDURE — 84443 ASSAY THYROID STIM HORMONE: CPT | Performed by: NURSE PRACTITIONER

## 2024-12-02 PROCEDURE — 84439 ASSAY OF FREE THYROXINE: CPT | Performed by: NURSE PRACTITIONER

## 2024-12-02 PROCEDURE — 80061 LIPID PANEL: CPT | Performed by: NURSE PRACTITIONER

## 2024-12-02 PROCEDURE — 82465 ASSAY BLD/SERUM CHOLESTEROL: CPT | Performed by: NURSE PRACTITIONER

## 2024-12-02 PROCEDURE — 86376 MICROSOMAL ANTIBODY EACH: CPT | Performed by: NURSE PRACTITIONER

## 2024-12-03 LAB — THYROPEROXIDASE AB SERPL-ACNC: <10 IU/ML
